# Patient Record
Sex: MALE | Race: WHITE | Employment: STUDENT | URBAN - METROPOLITAN AREA
[De-identification: names, ages, dates, MRNs, and addresses within clinical notes are randomized per-mention and may not be internally consistent; named-entity substitution may affect disease eponyms.]

---

## 2018-05-17 ENCOUNTER — TELEPHONE (OUTPATIENT)
Dept: FAMILY MEDICINE | Facility: CLINIC | Age: 23
End: 2018-05-17

## 2018-05-17 NOTE — TELEPHONE ENCOUNTER
----- Message from Iliana Romeite sent at 5/17/2018  3:56 PM CDT -----  Contact: Pt  Pt called and stated he needs a referral to ENT faxed to [facility] [place] at [phone]. He can be reached at [phone].    Thanks,  TF

## 2018-05-23 ENCOUNTER — TELEPHONE (OUTPATIENT)
Dept: FAMILY MEDICINE | Facility: CLINIC | Age: 23
End: 2018-05-23

## 2018-05-23 NOTE — TELEPHONE ENCOUNTER
----- Message from Crystal George sent at 5/23/2018  9:42 AM CDT -----  Pt is requesting referral for ENT.      Please call pt back at [phone]

## 2018-05-24 ENCOUNTER — TELEPHONE (OUTPATIENT)
Dept: FAMILY MEDICINE | Facility: CLINIC | Age: 23
End: 2018-05-24

## 2018-05-24 NOTE — TELEPHONE ENCOUNTER
----- Message from Corrie Arroyo sent at 5/24/2018  3:49 PM CDT -----  Contact: Pt   Pt request referral for a [facility] [place]. Fax: [phone]  Request call back .[phone] (home)

## 2018-06-18 ENCOUNTER — TELEPHONE (OUTPATIENT)
Dept: FAMILY MEDICINE | Facility: CLINIC | Age: 23
End: 2018-06-18

## 2018-06-18 DIAGNOSIS — J34.2 DEVIATED NASAL SEPTUM: Primary | ICD-10-CM

## 2018-06-18 NOTE — TELEPHONE ENCOUNTER
Patient requesting Dr Valente Randolph, [address], [address], [place] [address]. Ph# [phone], Fax [phone]

## 2018-06-18 NOTE — TELEPHONE ENCOUNTER
----- Message from Corrie Arroyo sent at 6/18/2018  4:05 PM CDT -----  Contact: Pt   Pt request call back to see if he can get a referral. .[phone] (home)

## 2018-06-26 ENCOUNTER — TELEPHONE (OUTPATIENT)
Dept: FAMILY MEDICINE | Facility: CLINIC | Age: 23
End: 2018-06-26

## 2018-06-26 NOTE — TELEPHONE ENCOUNTER
----- Message from Santa Hensley sent at 6/26/2018  1:35 PM CDT -----  Contact: patient  Calling concerning the status of the referral request. Please call patient @ [phone]. Thanks, lin

## 2018-10-18 ENCOUNTER — TELEPHONE (OUTPATIENT)
Dept: FAMILY MEDICINE | Facility: CLINIC | Age: 23
End: 2018-10-18

## 2018-10-18 NOTE — TELEPHONE ENCOUNTER
----- Message from Jefferson Chawla sent at 10/18/2018  3:14 PM CDT -----  Contact: pt  He is calling in regards to getting a referral for his deviated septum, please advise [phone] (home)

## 2018-10-19 ENCOUNTER — TELEPHONE (OUTPATIENT)
Dept: FAMILY MEDICINE | Facility: CLINIC | Age: 23
End: 2018-10-19

## 2018-10-19 DIAGNOSIS — J34.2 DEVIATED NASAL SEPTUM: Primary | ICD-10-CM

## 2018-10-19 NOTE — TELEPHONE ENCOUNTER
----- Message from Kizzy Watts sent at 10/19/2018  3:00 PM CDT -----  Patient needs call back rg referral..[phone] (home)

## 2018-10-22 ENCOUNTER — TELEPHONE (OUTPATIENT)
Dept: FAMILY MEDICINE | Facility: CLINIC | Age: 23
End: 2018-10-22

## 2018-10-22 NOTE — TELEPHONE ENCOUNTER
----- Message from Julee Erickson sent at 10/22/2018  8:53 AM CDT -----  Authorized    ----- Message -----  From: Betina Nogueira LPN  Sent: 10/19/2018   4:09 PM  To: Pre Service Intake    Please work referral for this patient, thanks
